# Patient Record
Sex: FEMALE
[De-identification: names, ages, dates, MRNs, and addresses within clinical notes are randomized per-mention and may not be internally consistent; named-entity substitution may affect disease eponyms.]

---

## 2019-05-13 PROBLEM — Z00.00 ENCOUNTER FOR PREVENTIVE HEALTH EXAMINATION: Status: ACTIVE | Noted: 2019-05-13

## 2019-05-16 ENCOUNTER — APPOINTMENT (OUTPATIENT)
Dept: PLASTIC SURGERY | Facility: CLINIC | Age: 49
End: 2019-05-16
Payer: COMMERCIAL

## 2019-05-16 VITALS
DIASTOLIC BLOOD PRESSURE: 91 MMHG | HEART RATE: 97 BPM | BODY MASS INDEX: 32.1 KG/M2 | HEIGHT: 61 IN | WEIGHT: 170 LBS | SYSTOLIC BLOOD PRESSURE: 150 MMHG

## 2019-05-16 DIAGNOSIS — Z80.3 FAMILY HISTORY OF MALIGNANT NEOPLASM OF BREAST: ICD-10-CM

## 2019-05-16 DIAGNOSIS — Z78.9 OTHER SPECIFIED HEALTH STATUS: ICD-10-CM

## 2019-05-16 PROCEDURE — 99202 OFFICE O/P NEW SF 15 MIN: CPT

## 2019-05-16 RX ORDER — CAPECITABINE 500 MG/1
TABLET ORAL
Refills: 0 | Status: ACTIVE | COMMUNITY

## 2019-05-16 RX ORDER — TAMOXIFEN CITRATE 20 MG/1
TABLET, FILM COATED ORAL
Refills: 0 | Status: ACTIVE | COMMUNITY

## 2019-05-20 NOTE — HISTORY OF PRESENT ILLNESS
[FreeTextEntry1] : 48 year old female self referred via online and presents for consultation regarding lymph node transplant for treatment of her right arm lymphedema. \par \par She has h/o stage 4 right breast cancer (chemo from 10/15-) followed by b/l mastectomy (ALND?) with TE Dr. Pang in [place], [place]) in 2016 followed by XRT (Dr. Stewart Rocha) completed in 2016. 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 She then underwent CARLITO flap reconstruction in 2017. \par Currently taking Tamoxifen and Capecitabine under the direction of Dr. Duran ([facility] [place], [place]).\par She states she first noted lymphedema 1 year ago in 2018. She has undergone intensive physical therapy, compression sleeve and pump, unrelieved. \par To note she underwent a PET/CT skull-thigh in 2018 with reportedly benign results and will undergo a follow up scan in 2019\par \par PMH: stage 4 right breast cancer in \par PSH: b/l mastectomy, \par Meds: capecitabine, tamoxifen \par Allergies: NKDA\par \par Options for management of lymphedema reviewed. PAtient is not interested in undergoing additional procedure unless it is likely to completely cure her lymphedema. Given severity and longevity of her symptoms she is unlikely to be cured, therefore advised her to seek additional treatment opinions.